# Patient Record
Sex: FEMALE | Race: WHITE | NOT HISPANIC OR LATINO
[De-identification: names, ages, dates, MRNs, and addresses within clinical notes are randomized per-mention and may not be internally consistent; named-entity substitution may affect disease eponyms.]

---

## 2022-05-02 PROBLEM — Z00.00 ENCOUNTER FOR PREVENTIVE HEALTH EXAMINATION: Status: ACTIVE | Noted: 2022-05-02

## 2022-06-24 ENCOUNTER — APPOINTMENT (OUTPATIENT)
Dept: GASTROENTEROLOGY | Facility: CLINIC | Age: 42
End: 2022-06-24
Payer: COMMERCIAL

## 2022-06-24 VITALS
DIASTOLIC BLOOD PRESSURE: 82 MMHG | RESPIRATION RATE: 14 BRPM | SYSTOLIC BLOOD PRESSURE: 180 MMHG | HEIGHT: 63 IN | OXYGEN SATURATION: 98 % | BODY MASS INDEX: 40.22 KG/M2 | HEART RATE: 68 BPM | WEIGHT: 227 LBS

## 2022-06-24 PROCEDURE — 99203 OFFICE O/P NEW LOW 30 MIN: CPT

## 2022-06-24 NOTE — ASSESSMENT
[FreeTextEntry1] : 42-year-old woman history of Crohn's with perirectal involvement as well as prior fistula here for establishing care.

## 2022-06-24 NOTE — HISTORY OF PRESENT ILLNESS
[de-identified] : 42-year-old woman history of Crohn's complicated by a fistula which required a seton many years ago, [place] patient here transferring her care to [facility].  Doing overall well now, significant weight gain over the pandemic, intermittent GI symptoms which might be related to Stelara infusion cycle.\par \par \par Initial dx: 2013\par CD: fistula\par \par Presentation:\par Meds failed:remicade (approx 3 y, Ab formed, was not doing with 6MP - anemia, MTX instead) Entyvio (good 3 weeks), Humira (good 2d)\par Current meds: Stelara since 2018; every 7 weeks\par Last date administered:6 weeks ago\par Last level checked:\par Last colonoscopy:summer 2021?\par Last imaging:\par \par Surgery:no; seton \par \par BM/D:3 (first one is 3 times in a row though)\par BM/night:0\par Blood:one time, with constipation\par Mucus:no (moisture); v rare, with constipation (doesn't leak out, coats stool)\par Weight loss:no\par Fatigue:tired\par \par Joint aches:mostly fine; occ swollen in middle fingers; hips "something brewing"; ballroom lessons  (no pain with mvmt, but pain after)\par 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, wrists, ankles, some knees\par pain in hips prior off biologic\par Skin issues:no\par Eye issues:no - needs glasses\par \par TB:\par HBV:\par \par random times she's sensitive\par it's time for prometheus level\par periods are triggers, in the past 6 mo - \par period would usually coincide with the injection, now no longer connected\par went off birth control last year, period super regular\par \par gentle foods, 1 accident - \par several near accidents\par \par vomited once, not liked she used to - had cheese and crackers and wine spritzer - maybe the acidity + bubbles; felt much better after she did.\par \par last time week 4 was destabilized, now fine at week 6\par \par 4/22 COVID - not bad\par congested in throat\par got the monoclonal Ab infusion\par 2 weeks to clear\par \par \par \par

## 2022-06-30 ENCOUNTER — NON-APPOINTMENT (OUTPATIENT)
Age: 42
End: 2022-06-30

## 2022-06-30 DIAGNOSIS — E55.9 VITAMIN D DEFICIENCY, UNSPECIFIED: ICD-10-CM

## 2022-06-30 LAB
25(OH)D3 SERPL-MCNC: 21.5 NG/ML
ALBUMIN SERPL ELPH-MCNC: 4.4 G/DL
ALP BLD-CCNC: 109 U/L
ALT SERPL-CCNC: 21 U/L
ANION GAP SERPL CALC-SCNC: 14 MMOL/L
AST SERPL-CCNC: 21 U/L
BASOPHILS # BLD AUTO: 0.05 K/UL
BASOPHILS NFR BLD AUTO: 0.6 %
BILIRUB SERPL-MCNC: 0.4 MG/DL
BUN SERPL-MCNC: 9 MG/DL
CALCIUM SERPL-MCNC: 9.4 MG/DL
CHLORIDE SERPL-SCNC: 102 MMOL/L
CHOLEST SERPL-MCNC: 248 MG/DL
CO2 SERPL-SCNC: 25 MMOL/L
CREAT SERPL-MCNC: 0.84 MG/DL
CRP SERPL-MCNC: 22 MG/L
EGFR: 89 ML/MIN/1.73M2
EOSINOPHIL # BLD AUTO: 0 K/UL
EOSINOPHIL NFR BLD AUTO: 0 %
ERYTHROCYTE [SEDIMENTATION RATE] IN BLOOD BY WESTERGREN METHOD: 65 MM/HR
GLUCOSE SERPL-MCNC: 91 MG/DL
HBV CORE IGM SER QL: NONREACTIVE
HBV SURFACE AB SER QL: REACTIVE
HBV SURFACE AG SER QL: NONREACTIVE
HCT VFR BLD CALC: 38.5 %
HDLC SERPL-MCNC: 38 MG/DL
HGB BLD-MCNC: 12.1 G/DL
IMM GRANULOCYTES NFR BLD AUTO: 0.4 %
LDLC SERPL CALC-MCNC: 170 MG/DL
LYMPHOCYTES # BLD AUTO: 1.81 K/UL
LYMPHOCYTES NFR BLD AUTO: 21.7 %
MAN DIFF?: NORMAL
MCHC RBC-ENTMCNC: 24.4 PG
MCHC RBC-ENTMCNC: 31.4 GM/DL
MCV RBC AUTO: 77.8 FL
MONOCYTES # BLD AUTO: 0.48 K/UL
MONOCYTES NFR BLD AUTO: 5.7 %
NEUTROPHILS # BLD AUTO: 5.98 K/UL
NEUTROPHILS NFR BLD AUTO: 71.6 %
NONHDLC SERPL-MCNC: 210 MG/DL
PLATELET # BLD AUTO: 308 K/UL
POTASSIUM SERPL-SCNC: 4.6 MMOL/L
PROT SERPL-MCNC: 7.1 G/DL
RBC # BLD: 4.95 M/UL
RBC # FLD: 15.3 %
SODIUM SERPL-SCNC: 140 MMOL/L
TRIGL SERPL-MCNC: 197 MG/DL
WBC # FLD AUTO: 8.35 K/UL

## 2022-06-30 RX ORDER — POLYETHYLENE GLYCOL 3350 17 G/17G
17 POWDER, FOR SOLUTION ORAL
Qty: 1 | Refills: 0 | Status: ACTIVE | COMMUNITY
Start: 2022-06-30 | End: 1900-01-01

## 2022-07-06 LAB
PROMETHEUS ANSER UST: NORMAL
SERUM USTEKINUMAB CONCENTRATION: 4.5 UG/ML
USTEKINUMAB ANTIBODIES CONCENTRATION: < 1.6 U/ML

## 2022-09-07 ENCOUNTER — TRANSCRIPTION ENCOUNTER (OUTPATIENT)
Age: 42
End: 2022-09-07

## 2022-09-07 ENCOUNTER — APPOINTMENT (OUTPATIENT)
Dept: GASTROENTEROLOGY | Facility: CLINIC | Age: 42
End: 2022-09-07

## 2022-09-07 VITALS
HEIGHT: 63 IN | RESPIRATION RATE: 14 BRPM | WEIGHT: 227 LBS | SYSTOLIC BLOOD PRESSURE: 122 MMHG | DIASTOLIC BLOOD PRESSURE: 82 MMHG | BODY MASS INDEX: 40.22 KG/M2 | OXYGEN SATURATION: 98 % | HEART RATE: 72 BPM

## 2022-09-07 DIAGNOSIS — Z78.9 OTHER SPECIFIED HEALTH STATUS: ICD-10-CM

## 2022-09-07 PROCEDURE — 99214 OFFICE O/P EST MOD 30 MIN: CPT

## 2022-09-07 NOTE — HISTORY OF PRESENT ILLNESS
[de-identified] : Initial dx: 2013\par CD: fistula\par \par Presentation:\par Meds failed:remicade (approx 3 y, Ab formed, was not doing with 6MP - anemia, MTX instead) Entyvio (good 3 weeks), Humira (good 2d)\par Current meds: Stelara since 2018; every 7 weeks\par Last date administered: 8/31\par Last level checked:7/22 4.5 nl Ab\par Last colonoscopy:\par Last imaging:\par \par \par Surgery:no\par \par BM/D:6 (3 little ones, then 3 little ones)\par same (used to be all at once over 1 hr in am, now t's basically twice a day)\par holding stool feels urgent\par BM/night:0\par Blood:occ; ?related to constipation\par today, 1 day before\par Mucus:scant; there is still "moisture" with harder stool a little bit\par Weight loss:\par Fatigue:slept whole weekend but has been really busy\par COVID a few weeks of being tired\par overall feels fine\par \par teaching 4 days a week, working on music and jewelry\par increased since pandemic, but prior used to teach 6 days a week\par \par \par Joint aches: hips sore criselda with dancing\par Skin issues:good; bumps all the time on her face, not there before Stelara; less than when she started (mild rosacea said a derm;  no red just bumpy)\par Eye issues:no\par \par TB:6/22 -ve\par HBV:6/22 -ve\par \par nl CBC\par ESR 65 CRP 22 nl <4\par unfavorable lipid profile - >300 total cholesterol in the past\par working with a dietician\par has an eating diorder - \par vit D 21.5\par \par ?risankizumab Skyrizi\par \par \par skipped stelara 2 weeks for covid\par ?vague abd pain ?timing related to this\par

## 2022-09-07 NOTE — ASSESSMENT
[FreeTextEntry1] : 42-year-old lady history of Crohn's doing well on Stelara every 7 weeks but previously failed multiple Biologics here for follow-up.  Topic of conversation today with eating disorder which is related to pattern of binge eating which started at 6 years old.  She is working with a nutritionist to have a better handle on intuitive eating.

## 2022-10-31 LAB — SARS-COV-2 N GENE NPH QL NAA+PROBE: NOT DETECTED

## 2022-11-02 ENCOUNTER — TRANSCRIPTION ENCOUNTER (OUTPATIENT)
Age: 42
End: 2022-11-02

## 2022-11-03 ENCOUNTER — OUTPATIENT (OUTPATIENT)
Dept: OUTPATIENT SERVICES | Facility: HOSPITAL | Age: 42
LOS: 1 days | End: 2022-11-03
Payer: COMMERCIAL

## 2022-11-03 ENCOUNTER — RESULT REVIEW (OUTPATIENT)
Age: 42
End: 2022-11-03

## 2022-11-03 ENCOUNTER — APPOINTMENT (OUTPATIENT)
Dept: GASTROENTEROLOGY | Facility: GI CENTER | Age: 42
End: 2022-11-03

## 2022-11-03 DIAGNOSIS — J34.2 DEVIATED NASAL SEPTUM: Chronic | ICD-10-CM

## 2022-11-03 DIAGNOSIS — K50.10 CROHN'S DISEASE OF LARGE INTESTINE WITHOUT COMPLICATIONS: ICD-10-CM

## 2022-11-03 PROCEDURE — 88305 TISSUE EXAM BY PATHOLOGIST: CPT

## 2022-11-03 PROCEDURE — 45380 COLONOSCOPY AND BIOPSY: CPT

## 2022-11-03 PROCEDURE — 88305 TISSUE EXAM BY PATHOLOGIST: CPT | Mod: 26

## 2022-11-03 NOTE — ASSESSMENT
[FreeTextEntry1] : 42 F CD on Stelara here for surveillance colon. \par \par The patient is medically optimized for procedure(s). All questions have been answered. The risks and benefits of the procedure have been discussed and the patient signed consent for the procedure. Preliminary results will be discussed when the patient wakes up from anesthesia, but definitive results will be discussed after the pathology report is issued in 5 business days.\par

## 2022-11-03 NOTE — HISTORY OF PRESENT ILLNESS
[FreeTextEntry1] : 42 F CD here for surveillance colon. \par On Stelara every 7 weeks. July level 4.5 no Ab. \par June CBC MCV 77 but Hb 12.1 WBC, plts fine. ESR 65, CRP 22 nl <4, vit D 21.5, unfavorable lipid profile  (<99 nl), total 210 nl <129, HDL 39 nl >51. CMP nl. HBV vaccinated, TB -ve. \par \par \par

## 2022-11-07 LAB — SURGICAL PATHOLOGY STUDY: SIGNIFICANT CHANGE UP

## 2022-11-08 ENCOUNTER — TRANSCRIPTION ENCOUNTER (OUTPATIENT)
Age: 42
End: 2022-11-08

## 2023-07-07 ENCOUNTER — RX RENEWAL (OUTPATIENT)
Age: 43
End: 2023-07-07

## 2023-08-02 ENCOUNTER — TRANSCRIPTION ENCOUNTER (OUTPATIENT)
Age: 43
End: 2023-08-02

## 2023-08-08 ENCOUNTER — TRANSCRIPTION ENCOUNTER (OUTPATIENT)
Age: 43
End: 2023-08-08

## 2023-09-13 ENCOUNTER — APPOINTMENT (OUTPATIENT)
Dept: GASTROENTEROLOGY | Facility: CLINIC | Age: 43
End: 2023-09-13
Payer: COMMERCIAL

## 2023-09-13 VITALS
SYSTOLIC BLOOD PRESSURE: 130 MMHG | RESPIRATION RATE: 14 BRPM | BODY MASS INDEX: 42.52 KG/M2 | OXYGEN SATURATION: 97 % | WEIGHT: 240 LBS | HEART RATE: 92 BPM | HEIGHT: 63 IN | DIASTOLIC BLOOD PRESSURE: 78 MMHG

## 2023-09-13 PROCEDURE — 99214 OFFICE O/P EST MOD 30 MIN: CPT

## 2023-12-01 ENCOUNTER — TRANSCRIPTION ENCOUNTER (OUTPATIENT)
Age: 43
End: 2023-12-01

## 2023-12-04 ENCOUNTER — LABORATORY RESULT (OUTPATIENT)
Age: 43
End: 2023-12-04

## 2023-12-05 ENCOUNTER — TRANSCRIPTION ENCOUNTER (OUTPATIENT)
Age: 43
End: 2023-12-05

## 2023-12-05 LAB
25(OH)D3 SERPL-MCNC: 18.8 NG/ML
CRP SERPL-MCNC: 22 MG/L
ERYTHROCYTE [SEDIMENTATION RATE] IN BLOOD BY WESTERGREN METHOD: 38 MM/HR
FERRITIN SERPL-MCNC: 40 NG/ML
FERRITIN SERPL-MCNC: 41 NG/ML
HBV CORE IGM SER QL: NONREACTIVE
HBV SURFACE AB SER QL: REACTIVE
HBV SURFACE AG SER QL: NONREACTIVE
HCT VFR BLD CALC: 39.7 %
HGB BLD-MCNC: 12 G/DL
IRON SATN MFR SERPL: 12 %
IRON SERPL-MCNC: 39 UG/DL
MCHC RBC-ENTMCNC: 24.2 PG
MCHC RBC-ENTMCNC: 30.2 GM/DL
MCV RBC AUTO: 80 FL
PLATELET # BLD AUTO: 290 K/UL
RBC # BLD: 4.96 M/UL
RBC # FLD: 15.5 %
TIBC SERPL-MCNC: 317 UG/DL
UIBC SERPL-MCNC: 278 UG/DL
WBC # FLD AUTO: 7.85 K/UL

## 2023-12-05 RX ORDER — ERGOCALCIFEROL 1.25 MG/1
1.25 MG CAPSULE, LIQUID FILLED ORAL
Qty: 12 | Refills: 1 | Status: ACTIVE | COMMUNITY
Start: 2022-06-30 | End: 1900-01-01

## 2023-12-06 LAB
M TB IFN-G BLD-IMP: NEGATIVE
QUANTIFERON TB PLUS MITOGEN MINUS NIL: 1.34 IU/ML
QUANTIFERON TB PLUS NIL: 0.01 IU/ML
QUANTIFERON TB PLUS TB1 MINUS NIL: 0 IU/ML
QUANTIFERON TB PLUS TB2 MINUS NIL: 0 IU/ML

## 2023-12-15 ENCOUNTER — TRANSCRIPTION ENCOUNTER (OUTPATIENT)
Age: 43
End: 2023-12-15

## 2024-01-03 ENCOUNTER — TRANSCRIPTION ENCOUNTER (OUTPATIENT)
Age: 44
End: 2024-01-03

## 2024-01-05 ENCOUNTER — APPOINTMENT (OUTPATIENT)
Dept: GASTROENTEROLOGY | Facility: CLINIC | Age: 44
End: 2024-01-05
Payer: COMMERCIAL

## 2024-01-05 VITALS
RESPIRATION RATE: 14 BRPM | HEART RATE: 90 BPM | OXYGEN SATURATION: 97 % | DIASTOLIC BLOOD PRESSURE: 78 MMHG | HEIGHT: 63 IN | SYSTOLIC BLOOD PRESSURE: 105 MMHG

## 2024-01-05 DIAGNOSIS — Z09 ENCOUNTER FOR FOLLOW-UP EXAMINATION AFTER COMPLETED TREATMENT FOR CONDITIONS OTHER THAN MALIGNANT NEOPLASM: ICD-10-CM

## 2024-01-05 PROCEDURE — 99213 OFFICE O/P EST LOW 20 MIN: CPT

## 2024-01-05 RX ORDER — MESALAMINE 1000 MG/1
1000 SUPPOSITORY RECTAL
Qty: 30 | Refills: 1 | Status: DISCONTINUED | COMMUNITY
Start: 2022-11-03 | End: 2024-01-05

## 2024-01-05 NOTE — ASSESSMENT
[FreeTextEntry1] : 43-year-old lady history of Crohn's on Stelara noticing increasing symptoms around week 7.  We had tried spacing the interval to every 8 weeks because she had been feeling well, but we had previously done it as frequently as every 6 weeks which was working at that time also.  She reports a recent increase in symptoms but this might be associated with holiday eating.  She refused to be weighed today.

## 2024-01-05 NOTE — PLAN
[TextEntry] : 1.  Increase frequency of Stelara to every 7 weeks.  Plan to repeat inflammatory markers after 2 injections every 7 weeks to reassess the existing level of inflammation.  Her CRP and ESR are persistently elevated, this might be multifactorial, the colonoscopy biopsies tend to indicate mild scattered disease. 2.  Follow-up in 4 months; patient aware that I am changing locations and she might need to call a different phone number to schedule this appointment.

## 2024-01-05 NOTE — HISTORY OF PRESENT ILLNESS
[FreeTextEntry1] : 43-year-old lady history of Crohn's here in follow-up.  Initial dx: 2013  CD: fistula  Presentation: Loose stool, occasional incontinence.  Meds failed:remicade (approx 3 y, Ab formed, was not doing with 6MP - anemia, MTX instead) Entyvio (good 3 weeks), Humira (good 2d)  Current meds: Stelara since 2018; every 8 weeks (had been approved for q7 weeks, but felt ok to extend to every 8, though perhaps now might need q7w again)  Last date administered: 12/7/23  Last level checked: December 4, 2023 drug level 1.6, antibodies less than 40 (almost 8 week leslie for this trough level) 7/22 4.5 nl Ab  Last colonoscopy: November 2022: Impressions:    Normal perineum, no irritation appreciated. Few shallow rectal ulcers, 1 mm,  spf biopsied in the rectum jar. There was evidence of burned out colitis on the  left side, starting approx at 40 cm. The terminal ileum was cannulated and  appeared normal. Four cold forceps bx obtained every 10 cm starting at 80 cm  (cecum). There was a hyperplastic appearing polyp at 80 cm in the ascending  colon, removed using cold forceps bx. .  Path Final Diagnosis 1. Terminal ileum, biopsy: - Small intestinal mucosa with Peyer's patches. - Negative for granulomas. Negative for dysplasia.  2. Colon, 80 cm, biopsy: - Consistent with mild active inflammatory bowel disease. - Negative for granulomas. Negative for dysplasia.  3. Ascending colon, polyp, biopsy: - Consistent with active inflammatory bowel disease. - Negative for granulomas. Negative for dysplasia.  4. Colon, 70 cm, biopsy: - Consistent with focal mild active inflammatory bowel disease. - Negative for granulomas. Negative for dysplasia.  5. Colon, 60 cm, biopsy: - Consistent with quiescent inflammatory bowel disease. - Negative for granulomas. Negative for dysplasia.  6. Colon, 50 cm, biopsy: - Consistent with quiescent inflammatory bowel disease. - Negative for granulomas. Negative for dysplasia.  7. Colon, 40 cm, biopsy: - Consistent with quiescent inflammatory bowel disease. - Negative for granulomas. Negative for dysplasia.  8. Colon, 30 cm, biopsy: - Consistent with quiescent inflammatory bowel disease. - Negative for granulomas. Negative for dysplasia.  9. Colon, 20 cm, biopsy: - Consistent with quiescent inflammatory bowel disease. - Negative for granulomas. Negative for dysplasia.  10. Colon, 10 cm, biopsy: - Consistent with quiescent inflammatory bowel disease. - Negative for granulomas. Negative for dysplasia.  11. Rectum, biopsy: - Consistent with active inflammatory bowel disease. - Negative for granulomas. Negative for dysplasia.  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, promo, branding, magazine article  BM/night:0  not good until injection, but after better stopped coffee, avoiding trigger foods ok to phase this back in now there was a day when she ate too much and then didn't feel well but no spf food trigger overall feels a lot better; small can of coffee, went ok prev avoiding dairy, fried now can tolerate some  Blood: once in a while, because of hard poops (not painful but feels like a superficial cut)  Mucus: if constipated, coats the poop  Weight loss: no  Fatigue: a little low but also allergies worse  (mouth breathing at night, drinks water) snoring worse, gained weight will discuss sleep study with PCP at next physical  Joint aches: occ can feel thickness in fingers at end of day  Skin issues; hair thinner (not losing, thinks age related, changing conditioners because hair feels oily all the time), skin different  Eye issues: needs glasses, will get checked (eyes get tired criselda doing jewelry, computer screens)  TB:-ve December 2023  HBV:-ve December 2023 December 2023 labs: Iron saturation 12%, normal 14 to 50%; normal iron serum, TIBC, UIBC.  ferritin 41 Normal CBC with a hemoglobin of 12, MCV 80, WBC 7.8, platelets 290.  Hemoglobin not substantially different from June 2022 it was 12.1, MCV at that time was 77.8 so improved now. ESR 38, last time 65 June 2022; CRP 22, last time 22 June 2022. Low vitamin D at 18.8 last time 21.5 - taking the weekly supplement   December 2022: CBC hemoglobin 12.7, MCV 74, white cells 9.7, platelets 324 These are labs from [facility], notes are made that iron studies were added, I do not have those at this time.

## 2024-03-03 ENCOUNTER — TRANSCRIPTION ENCOUNTER (OUTPATIENT)
Age: 44
End: 2024-03-03

## 2024-03-07 ENCOUNTER — TRANSCRIPTION ENCOUNTER (OUTPATIENT)
Age: 44
End: 2024-03-07

## 2024-03-12 ENCOUNTER — TRANSCRIPTION ENCOUNTER (OUTPATIENT)
Age: 44
End: 2024-03-12

## 2024-03-16 ENCOUNTER — TRANSCRIPTION ENCOUNTER (OUTPATIENT)
Age: 44
End: 2024-03-16

## 2024-03-16 RX ORDER — USTEKINUMAB 90 MG/ML
90 INJECTION, SOLUTION SUBCUTANEOUS
Qty: 1 | Refills: 12 | Status: DISCONTINUED | COMMUNITY
Start: 2022-06-24 | End: 2024-03-16

## 2024-03-18 ENCOUNTER — TRANSCRIPTION ENCOUNTER (OUTPATIENT)
Age: 44
End: 2024-03-18

## 2024-03-28 RX ORDER — USTEKINUMAB 90 MG/ML
INJECTION, SOLUTION SUBCUTANEOUS
Qty: 1 | Refills: 12 | Status: ACTIVE | COMMUNITY
Start: 2024-03-16 | End: 1900-01-01

## 2024-05-14 ENCOUNTER — TRANSCRIPTION ENCOUNTER (OUTPATIENT)
Age: 44
End: 2024-05-14

## 2024-05-20 ENCOUNTER — TRANSCRIPTION ENCOUNTER (OUTPATIENT)
Age: 44
End: 2024-05-20

## 2024-05-29 ENCOUNTER — TRANSCRIPTION ENCOUNTER (OUTPATIENT)
Age: 44
End: 2024-05-29

## 2024-05-31 ENCOUNTER — APPOINTMENT (OUTPATIENT)
Dept: GASTROENTEROLOGY | Facility: CLINIC | Age: 44
End: 2024-05-31
Payer: COMMERCIAL

## 2024-05-31 DIAGNOSIS — K50.10 CROHN'S DISEASE OF LARGE INTESTINE W/OUT COMPLICATIONS: ICD-10-CM

## 2024-05-31 PROCEDURE — 99443: CPT

## 2024-05-31 NOTE — HISTORY OF PRESENT ILLNESS
[FreeTextEntry1] : Visit type: Telephonic (phone only) Patient Location: [place], [place] Provider Location: [address] [place], [place] [id number] Consent obtained for visit:Yes Visit length: 30 minutes Others present: No  44-year-old lady history of Crohn's here in follow-up.  Initial dx: 2013  CD: fistula  Presentation: Loose stool, occasional incontinence.  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 Few shallow rectal ulcers, 1 mm,  spf biopsied in the rectum jar. There was evidence of burned out colitis on the  left side, starting approx at 40 cm. The terminal ileum was cannulated and  appeared normal. Four cold forceps bx obtained every 10 cm starting at 80 cm  (cecum). There was a hyperplastic appearing polyp at 80 cm in the ascending  colon, removed using cold forceps bx. .  Path Final Diagnosis 1. Terminal ileum, biopsy: - Small intestinal mucosa with Peyer's patches. - Negative for granulomas. Negative for dysplasia.  2. Colon, 80 cm, biopsy: - Consistent with mild active inflammatory bowel disease. - Negative for granulomas. Negative for dysplasia.  3. Ascending colon, polyp, biopsy: - Consistent with active inflammatory bowel disease. - Negative for granulomas. Negative for dysplasia.  4. Colon, 70 cm, biopsy: - Consistent with focal mild active inflammatory bowel disease. - Negative for granulomas. Negative for dysplasia.  5. Colon, 60 cm, biopsy: - Consistent with quiescent inflammatory bowel disease. - Negative for granulomas. Negative for dysplasia.  6. Colon, 50 cm, biopsy: - Consistent with quiescent inflammatory bowel disease. - Negative for granulomas. Negative for dysplasia.  7. Colon, 40 cm, biopsy: - Consistent with quiescent inflammatory bowel disease. - Negative for granulomas. Negative for dysplasia.  8. Colon, 30 cm, biopsy: - Consistent with quiescent inflammatory bowel disease. - Negative for granulomas. Negative for dysplasia.  9. Colon, 20 cm, biopsy: - Consistent with quiescent inflammatory bowel disease. - Negative for granulomas. Negative for dysplasia.  10. Colon, 10 cm, biopsy: - Consistent with quiescent inflammatory bowel disease. - Negative for granulomas. Negative for dysplasia.  11. Rectum, biopsy: - Consistent with active inflammatory bowel disease. - Negative for granulomas. Negative for dysplasia.  Last imaging: Not available  Surgery:no   BM/D:5 in am, max 2 more as day goes on period--constipation end of colon is narrow--big constipated poop will not come out--only makes it worse enema--everything crazy after then reactive not this hard every month PRN Miralax this week - will also try before menses next month  BM/night:0   Blood: once in a while, because of hard poops (not painful but feels like a superficial cut); not a lot  Mucus: if constipated, coats the poop  Weight loss: no  Fatigue: no; mouth guard to prevent snoring  Joint aches: occ can feel thickness in fingers at end of day  Skin issues; skin rash--spot on hand initially, thought she touched something---derm/cream--spread other places derm 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 ferritin 41 Normal CBC with a hemoglobin of 12, MCV 80, WBC 7.8, platelets 290. Hemoglobin not substantially different from June 2022 it was 12.1, MCV at that time was 77.8 so improved now. ESR 38, last time 65 June 2022; CRP 22, last time 22 June 2022. Low vitamin D at 18.8 last time 21.5 - taking the weekly supplement   December 2022: CBC hemoglobin 12.7, MCV 74, white cells 9.7, platelets 324 These are labs from [facility], notes are made that iron studies were added, I do not have those at this time.

## 2024-05-31 NOTE — PLAN
[TextEntry] : 1. infl labs and Stelara level pre -next injection 2. f/u 2 mo - IBD center 3. auth team made aware of q7w interval for Stelara - pt will also call insurance to see if she can facilitate

## 2024-05-31 NOTE — ASSESSMENT
[FreeTextEntry1] : 44 F CD on Stelara. Last level Dec 2023 low (1.6) and concern for increasing food sensitivity so decided to increase freq to q7w. Some confusion re: auth for this shorter interval - conclusion of back and forth argument is that we would try q7w.  She is experiencing a rash which might have been related to allergies and is improving with Claritin. The onset did coincide with the March Stelara injection and it worsened after the more recent shot. However, it's not at the injection site, it's migratory and does seem to be improving.

## 2024-06-18 ENCOUNTER — TRANSCRIPTION ENCOUNTER (OUTPATIENT)
Age: 44
End: 2024-06-18

## 2024-06-23 ENCOUNTER — TRANSCRIPTION ENCOUNTER (OUTPATIENT)
Age: 44
End: 2024-06-23

## 2024-06-23 LAB
CRP SERPL-MCNC: 27 MG/L
ERYTHROCYTE [SEDIMENTATION RATE] IN BLOOD BY WESTERGREN METHOD: 65 MM/HR

## 2024-06-28 ENCOUNTER — TRANSCRIPTION ENCOUNTER (OUTPATIENT)
Age: 44
End: 2024-06-28

## 2024-06-28 LAB
PROMETHEUS ANSER UST: NORMAL
SERUM USTEKINUMAB CONCENTRATION: 2.7 UG/ML
USTEKINUMAB ANTIBODIES CONCENTRATION: < 1.6 U/ML

## 2024-07-17 ENCOUNTER — TRANSCRIPTION ENCOUNTER (OUTPATIENT)
Age: 44
End: 2024-07-17

## 2024-08-23 ENCOUNTER — TRANSCRIPTION ENCOUNTER (OUTPATIENT)
Age: 44
End: 2024-08-23

## 2024-09-13 ENCOUNTER — APPOINTMENT (OUTPATIENT)
Dept: GASTROENTEROLOGY | Facility: CLINIC | Age: 44
End: 2024-09-13
Payer: COMMERCIAL

## 2024-09-13 DIAGNOSIS — K50.10 CROHN'S DISEASE OF LARGE INTESTINE W/OUT COMPLICATIONS: ICD-10-CM

## 2024-09-13 PROCEDURE — 99214 OFFICE O/P EST MOD 30 MIN: CPT

## 2024-09-13 RX ORDER — POLYETHYLENE GLYCOL 3350 17 G/17G
17 POWDER, FOR SOLUTION ORAL
Qty: 1 | Refills: 0 | Status: ACTIVE | COMMUNITY
Start: 2024-09-13 | End: 1900-01-01

## 2024-09-15 LAB
25(OH)D3 SERPL-MCNC: 29.8 NG/ML
ALBUMIN SERPL ELPH-MCNC: 4.2 G/DL
ALP BLD-CCNC: 98 U/L
ALT SERPL-CCNC: 18 U/L
ANION GAP SERPL CALC-SCNC: 13 MMOL/L
AST SERPL-CCNC: 17 U/L
BILIRUB SERPL-MCNC: 0.3 MG/DL
BUN SERPL-MCNC: 11 MG/DL
CALCIUM SERPL-MCNC: 9.5 MG/DL
CHLORIDE SERPL-SCNC: 102 MMOL/L
CO2 SERPL-SCNC: 24 MMOL/L
CREAT SERPL-MCNC: 0.84 MG/DL
EGFR: 88 ML/MIN/1.73M2
FERRITIN SERPL-MCNC: 49 NG/ML
GLUCOSE SERPL-MCNC: 102 MG/DL
HBV CORE IGM SER QL: NONREACTIVE
HBV SURFACE AB SER QL: REACTIVE
HBV SURFACE AG SER QL: NONREACTIVE
HCT VFR BLD CALC: 37.9 %
HGB BLD-MCNC: 11.7 G/DL
IRON SATN MFR SERPL: 14 %
IRON SERPL-MCNC: 43 UG/DL
MCHC RBC-ENTMCNC: 24.4 PG
MCHC RBC-ENTMCNC: 30.9 GM/DL
MCV RBC AUTO: 79 FL
PLATELET # BLD AUTO: 280 K/UL
POTASSIUM SERPL-SCNC: 4.2 MMOL/L
PROT SERPL-MCNC: 6.9 G/DL
RBC # BLD: 4.8 M/UL
RBC # FLD: 15.7 %
SODIUM SERPL-SCNC: 139 MMOL/L
TIBC SERPL-MCNC: 304 UG/DL
UIBC SERPL-MCNC: 261 UG/DL
WBC # FLD AUTO: 10.15 K/UL

## 2024-09-15 NOTE — HISTORY OF PRESENT ILLNESS
[FreeTextEntry1] : Visit type: Video visit Patient Location: [place], [place] Provider Location: [address] [place] [id number] Consent obtained for visit:Yes Visit length: 30 minutes Others present: No  44-year-old lady history of Crohn's here in follow-up.  Plan at last ov My 2024: 1. infl labs and Stelara level pre -next injection 2. f/u 2 mo - IBD center 3. auth team made aware of q7w interval for Stelara - pt will also call insurance to see if she can facilitate  Initial dx: 2013  CD: fistula  Presentation: Loose stool, occasional incontinence.  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 Few shallow rectal ulcers, 1 mm,  spf biopsied in the rectum jar. There was evidence of burned out colitis on the  left side, starting approx at 40 cm. The terminal ileum was cannulated and  appeared normal. Four cold forceps bx obtained every 10 cm starting at 80 cm  (cecum). There was a hyperplastic appearing polyp at 80 cm in the ascending  colon, removed using cold forceps bx. .  Path Final Diagnosis 1. Terminal ileum, biopsy: - Small intestinal mucosa with Peyer's patches. - Negative for granulomas. Negative for dysplasia.  2. Colon, 80 cm, biopsy: - Consistent with mild active inflammatory bowel disease. - Negative for granulomas. Negative for dysplasia.  3. Ascending colon, polyp, biopsy: - Consistent with active inflammatory bowel disease. - Negative for granulomas. Negative for dysplasia.  4. Colon, 70 cm, biopsy: - Consistent with focal mild active inflammatory bowel disease. - Negative for granulomas. Negative for dysplasia.  5. Colon, 60 cm, biopsy: - Consistent with quiescent inflammatory bowel disease. - Negative for granulomas. Negative for dysplasia.  6. Colon, 50 cm, biopsy: - Consistent with quiescent inflammatory bowel disease. - Negative for granulomas. Negative for dysplasia.  7. Colon, 40 cm, biopsy: - Consistent with quiescent inflammatory bowel disease. - Negative for granulomas. Negative for dysplasia.  8. Colon, 30 cm, biopsy: - Consistent with quiescent inflammatory bowel disease. - Negative for granulomas. Negative for dysplasia.  9. Colon, 20 cm, biopsy: - Consistent with quiescent inflammatory bowel disease. - Negative for granulomas. Negative for dysplasia.  10. Colon, 10 cm, biopsy: - Consistent with quiescent inflammatory bowel disease. - Negative for granulomas. Negative for dysplasia.  11. Rectum, biopsy: - Consistent with active inflammatory bowel disease. - Negative for granulomas. Negative for dysplasia.  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[place], [place])  Joint aches: no  Skin issues; sun initially then scratchy skin on belly ??eczema dry/itchy--reg creams don't work beginning of fall--itchy hives on legs threw out bedding--worse with polyester fully cotton better on claritin migratory  skin rash--spot on hand initially, thought she touched something---derm/cream--spread other places 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 ferritin 41 Normal CBC with a hemoglobin of 12, MCV 80, WBC 7.8, platelets 290. Hemoglobin not substantially different from June 2022 it was 12.1, MCV at that time was 77.8 so improved now. ESR 38, last time 65 June 2022; CRP 22, last time 22 June 2022. Low vitamin D at 18.8 last time 21.5 - taking the weekly supplement  December 2022: CBC hemoglobin 12.7, MCV 74, white cells 9.7, platelets 324 These are labs from [facility], notes are made that iron studies were added, I do not have those at this time.  year round allergies now phlegm on vocal cords

## 2024-09-15 NOTE — HISTORY OF PRESENT ILLNESS
[FreeTextEntry1] : Visit type: Video visit Patient Location: [place], [place] Provider Location: [address] [place] [id number] Consent obtained for visit:Yes Visit length: 30 minutes Others present: No  44-year-old lady history of Crohn's here in follow-up.  Plan at last ov My 2024: 1. infl labs and Stelara level pre -next injection 2. f/u 2 mo - IBD center 3. auth team made aware of q7w interval for Stelara - pt will also call insurance to see if she can facilitate  Initial dx: 2013  CD: fistula  Presentation: Loose stool, occasional incontinence.  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 Few shallow rectal ulcers, 1 mm,  spf biopsied in the rectum jar. There was evidence of burned out colitis on the  left side, starting approx at 40 cm. The terminal ileum was cannulated and  appeared normal. Four cold forceps bx obtained every 10 cm starting at 80 cm  (cecum). There was a hyperplastic appearing polyp at 80 cm in the ascending  colon, removed using cold forceps bx. .  Path Final Diagnosis 1. Terminal ileum, biopsy: - Small intestinal mucosa with Peyer's patches. - Negative for granulomas. Negative for dysplasia.  2. Colon, 80 cm, biopsy: - Consistent with mild active inflammatory bowel disease. - Negative for granulomas. Negative for dysplasia.  3. Ascending colon, polyp, biopsy: - Consistent with active inflammatory bowel disease. - Negative for granulomas. Negative for dysplasia.  4. Colon, 70 cm, biopsy: - Consistent with focal mild active inflammatory bowel disease. - Negative for granulomas. Negative for dysplasia.  5. Colon, 60 cm, biopsy: - Consistent with quiescent inflammatory bowel disease. - Negative for granulomas. Negative for dysplasia.  6. Colon, 50 cm, biopsy: - Consistent with quiescent inflammatory bowel disease. - Negative for granulomas. Negative for dysplasia.  7. Colon, 40 cm, biopsy: - Consistent with quiescent inflammatory bowel disease. - Negative for granulomas. Negative for dysplasia.  8. Colon, 30 cm, biopsy: - Consistent with quiescent inflammatory bowel disease. - Negative for granulomas. Negative for dysplasia.  9. Colon, 20 cm, biopsy: - Consistent with quiescent inflammatory bowel disease. - Negative for granulomas. Negative for dysplasia.  10. Colon, 10 cm, biopsy: - Consistent with quiescent inflammatory bowel disease. - Negative for granulomas. Negative for dysplasia.  11. Rectum, biopsy: - Consistent with active inflammatory bowel disease. - Negative for granulomas. Negative for dysplasia.  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[place], [place])  Joint aches: no  Skin issues; sun initially then scratchy skin on belly ??eczema dry/itchy--reg creams don't work beginning of fall--itchy hives on legs threw out bedding--worse with polyester fully cotton better on claritin migratory  skin rash--spot on hand initially, thought she touched something---derm/cream--spread other places 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 ferritin 41 Normal CBC with a hemoglobin of 12, MCV 80, WBC 7.8, platelets 290. Hemoglobin not substantially different from June 2022 it was 12.1, MCV at that time was 77.8 so improved now. ESR 38, last time 65 June 2022; CRP 22, last time 22 June 2022. Low vitamin D at 18.8 last time 21.5 - taking the weekly supplement  December 2022: CBC hemoglobin 12.7, MCV 74, white cells 9.7, platelets 324 These are labs from [facility], notes are made that iron studies were added, I do not have those at this time.  year round allergies now phlegm on vocal cords

## 2024-09-15 NOTE — PLAN
[TextEntry] : 1. Colon. Indication: CD surveillance.>8 y dz  The procedure(s) was (were) discussed in detail with the patient, including indications, alternatives and limitations.  The risks and benefits were reviewed.  If applicable, the prep directions were reviewed as well, else the patient was told to fast on the day of the procedure.   2. Labs 3. Continue derm f/u

## 2024-09-15 NOTE — HISTORY OF PRESENT ILLNESS
[FreeTextEntry1] : Visit type: Video visit Patient Location: [place], [place] Provider Location: [address] [place] [id number] Consent obtained for visit:Yes Visit length: 30 minutes Others present: No  44-year-old lady history of Crohn's here in follow-up.  Plan at last ov My 2024: 1. infl labs and Stelara level pre -next injection 2. f/u 2 mo - IBD center 3. auth team made aware of q7w interval for Stelara - pt will also call insurance to see if she can facilitate  Initial dx: 2013  CD: fistula  Presentation: Loose stool, occasional incontinence.  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 Few shallow rectal ulcers, 1 mm,  spf biopsied in the rectum jar. There was evidence of burned out colitis on the  left side, starting approx at 40 cm. The terminal ileum was cannulated and  appeared normal. Four cold forceps bx obtained every 10 cm starting at 80 cm  (cecum). There was a hyperplastic appearing polyp at 80 cm in the ascending  colon, removed using cold forceps bx. .  Path Final Diagnosis 1. Terminal ileum, biopsy: - Small intestinal mucosa with Peyer's patches. - Negative for granulomas. Negative for dysplasia.  2. Colon, 80 cm, biopsy: - Consistent with mild active inflammatory bowel disease. - Negative for granulomas. Negative for dysplasia.  3. Ascending colon, polyp, biopsy: - Consistent with active inflammatory bowel disease. - Negative for granulomas. Negative for dysplasia.  4. Colon, 70 cm, biopsy: - Consistent with focal mild active inflammatory bowel disease. - Negative for granulomas. Negative for dysplasia.  5. Colon, 60 cm, biopsy: - Consistent with quiescent inflammatory bowel disease. - Negative for granulomas. Negative for dysplasia.  6. Colon, 50 cm, biopsy: - Consistent with quiescent inflammatory bowel disease. - Negative for granulomas. Negative for dysplasia.  7. Colon, 40 cm, biopsy: - Consistent with quiescent inflammatory bowel disease. - Negative for granulomas. Negative for dysplasia.  8. Colon, 30 cm, biopsy: - Consistent with quiescent inflammatory bowel disease. - Negative for granulomas. Negative for dysplasia.  9. Colon, 20 cm, biopsy: - Consistent with quiescent inflammatory bowel disease. - Negative for granulomas. Negative for dysplasia.  10. Colon, 10 cm, biopsy: - Consistent with quiescent inflammatory bowel disease. - Negative for granulomas. Negative for dysplasia.  11. Rectum, biopsy: - Consistent with active inflammatory bowel disease. - Negative for granulomas. Negative for dysplasia.  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[place], [place])  Joint aches: no  Skin issues; sun initially then scratchy skin on belly ??eczema dry/itchy--reg creams don't work beginning of fall--itchy hives on legs threw out bedding--worse with polyester fully cotton better on claritin migratory  skin rash--spot on hand initially, thought she touched something---derm/cream--spread other places 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 ferritin 41 Normal CBC with a hemoglobin of 12, MCV 80, WBC 7.8, platelets 290. Hemoglobin not substantially different from June 2022 it was 12.1, MCV at that time was 77.8 so improved now. ESR 38, last time 65 June 2022; CRP 22, last time 22 June 2022. Low vitamin D at 18.8 last time 21.5 - taking the weekly supplement  December 2022: CBC hemoglobin 12.7, MCV 74, white cells 9.7, platelets 324 These are labs from [facility], notes are made that iron studies were added, I do not have those at this time.  year round allergies now phlegm on vocal cords

## 2024-09-15 NOTE — ASSESSMENT
[FreeTextEntry1] : 44 F CD on Stelara q7 here in f/u. Doing well CD-wise, though skin issues all summer - now most recently hives, but prior small bumps - itchy, migratory, non-sun exposed areas.

## 2024-09-18 ENCOUNTER — TRANSCRIPTION ENCOUNTER (OUTPATIENT)
Age: 44
End: 2024-09-18

## 2024-09-19 ENCOUNTER — TRANSCRIPTION ENCOUNTER (OUTPATIENT)
Age: 44
End: 2024-09-19

## 2024-11-15 ENCOUNTER — TRANSCRIPTION ENCOUNTER (OUTPATIENT)
Age: 44
End: 2024-11-15

## 2024-11-21 ENCOUNTER — RESULT REVIEW (OUTPATIENT)
Age: 44
End: 2024-11-21

## 2024-11-21 ENCOUNTER — APPOINTMENT (OUTPATIENT)
Dept: GASTROENTEROLOGY | Facility: GI CENTER | Age: 44
End: 2024-11-21

## 2024-11-21 PROCEDURE — 45380 COLONOSCOPY AND BIOPSY: CPT

## 2024-11-27 ENCOUNTER — TRANSCRIPTION ENCOUNTER (OUTPATIENT)
Age: 44
End: 2024-11-27

## 2024-12-02 ENCOUNTER — TRANSCRIPTION ENCOUNTER (OUTPATIENT)
Age: 44
End: 2024-12-02

## 2024-12-05 ENCOUNTER — APPOINTMENT (OUTPATIENT)
Dept: GASTROENTEROLOGY | Facility: CLINIC | Age: 44
End: 2024-12-05
Payer: COMMERCIAL

## 2024-12-05 VITALS
HEART RATE: 88 BPM | OXYGEN SATURATION: 98 % | HEIGHT: 55 IN | SYSTOLIC BLOOD PRESSURE: 118 MMHG | DIASTOLIC BLOOD PRESSURE: 78 MMHG | WEIGHT: 254 LBS | BODY MASS INDEX: 58.78 KG/M2

## 2024-12-05 DIAGNOSIS — K50.10 CROHN'S DISEASE OF LARGE INTESTINE W/OUT COMPLICATIONS: ICD-10-CM

## 2024-12-05 PROCEDURE — 99215 OFFICE O/P EST HI 40 MIN: CPT

## 2024-12-05 RX ORDER — BUDESONIDE 28 MG/1
2 AEROSOL, FOAM RECTAL
Qty: 1 | Refills: 3 | Status: ACTIVE | COMMUNITY
Start: 2024-12-05 | End: 1900-01-01

## 2024-12-05 RX ORDER — MESALAMINE 1000 MG/1
1000 SUPPOSITORY RECTAL
Qty: 90 | Refills: 1 | Status: ACTIVE | COMMUNITY
Start: 2024-12-05 | End: 1900-01-01

## 2024-12-05 RX ORDER — RISANKIZUMAB-RZAA 60 MG/ML
600 INJECTION INTRAVENOUS
Qty: 1 | Refills: 3 | Status: ACTIVE | COMMUNITY
Start: 2024-12-05 | End: 1900-01-01

## 2024-12-05 RX ORDER — RISANKIZUMAB-RZAA 360 MG/2.4
360 KIT SUBCUTANEOUS
Qty: 2.4 | Refills: 12 | Status: ACTIVE | COMMUNITY
Start: 2024-12-05 | End: 1900-01-01

## 2024-12-05 RX ORDER — BUDESONIDE 28 MG/1
2 AEROSOL, FOAM RECTAL
Qty: 1 | Refills: 1 | Status: ACTIVE | COMMUNITY
Start: 2024-12-05 | End: 1900-01-01

## 2024-12-05 RX ORDER — FEXOFENADINE HCL 60 MG
CAPSULE ORAL
Refills: 0 | Status: ACTIVE | COMMUNITY

## 2024-12-27 ENCOUNTER — TRANSCRIPTION ENCOUNTER (OUTPATIENT)
Age: 44
End: 2024-12-27

## 2025-01-01 ENCOUNTER — TRANSCRIPTION ENCOUNTER (OUTPATIENT)
Age: 45
End: 2025-01-01

## 2025-01-23 ENCOUNTER — TRANSCRIPTION ENCOUNTER (OUTPATIENT)
Age: 45
End: 2025-01-23

## 2025-02-03 ENCOUNTER — TRANSCRIPTION ENCOUNTER (OUTPATIENT)
Age: 45
End: 2025-02-03

## 2025-02-06 ENCOUNTER — APPOINTMENT (OUTPATIENT)
Dept: GASTROENTEROLOGY | Facility: CLINIC | Age: 45
End: 2025-02-06
Payer: COMMERCIAL

## 2025-02-06 VITALS
DIASTOLIC BLOOD PRESSURE: 70 MMHG | OXYGEN SATURATION: 98 % | WEIGHT: 254 LBS | BODY MASS INDEX: 45 KG/M2 | RESPIRATION RATE: 16 BRPM | HEIGHT: 63 IN | SYSTOLIC BLOOD PRESSURE: 120 MMHG | HEART RATE: 70 BPM

## 2025-02-06 DIAGNOSIS — K50.10 CROHN'S DISEASE OF LARGE INTESTINE W/OUT COMPLICATIONS: ICD-10-CM

## 2025-02-06 PROCEDURE — 99214 OFFICE O/P EST MOD 30 MIN: CPT

## 2025-02-06 PROCEDURE — G2211 COMPLEX E/M VISIT ADD ON: CPT | Mod: NC

## 2025-03-01 ENCOUNTER — TRANSCRIPTION ENCOUNTER (OUTPATIENT)
Age: 45
End: 2025-03-01

## 2025-03-03 ENCOUNTER — TRANSCRIPTION ENCOUNTER (OUTPATIENT)
Age: 45
End: 2025-03-03

## 2025-03-04 ENCOUNTER — APPOINTMENT (OUTPATIENT)
Dept: GASTROENTEROLOGY | Facility: CLINIC | Age: 45
End: 2025-03-04
Payer: COMMERCIAL

## 2025-03-04 ENCOUNTER — NON-APPOINTMENT (OUTPATIENT)
Age: 45
End: 2025-03-04

## 2025-03-04 VITALS
RESPIRATION RATE: 16 BRPM | SYSTOLIC BLOOD PRESSURE: 130 MMHG | OXYGEN SATURATION: 98 % | DIASTOLIC BLOOD PRESSURE: 82 MMHG | HEART RATE: 82 BPM

## 2025-03-04 VITALS — SYSTOLIC BLOOD PRESSURE: 112 MMHG | RESPIRATION RATE: 16 BRPM | HEART RATE: 83 BPM | DIASTOLIC BLOOD PRESSURE: 72 MMHG

## 2025-03-04 DIAGNOSIS — K50.10 CROHN'S DISEASE OF LARGE INTESTINE W/OUT COMPLICATIONS: ICD-10-CM

## 2025-03-04 DIAGNOSIS — Z09 ENCOUNTER FOR FOLLOW-UP EXAMINATION AFTER COMPLETED TREATMENT FOR CONDITIONS OTHER THAN MALIGNANT NEOPLASM: ICD-10-CM

## 2025-03-04 PROCEDURE — 99213 OFFICE O/P EST LOW 20 MIN: CPT | Mod: 25

## 2025-03-04 PROCEDURE — 96413 CHEMO IV INFUSION 1 HR: CPT

## 2025-03-05 ENCOUNTER — TRANSCRIPTION ENCOUNTER (OUTPATIENT)
Age: 45
End: 2025-03-05

## 2025-03-08 ENCOUNTER — TRANSCRIPTION ENCOUNTER (OUTPATIENT)
Age: 45
End: 2025-03-08

## 2025-03-14 ENCOUNTER — TRANSCRIPTION ENCOUNTER (OUTPATIENT)
Age: 45
End: 2025-03-14

## 2025-03-24 ENCOUNTER — TRANSCRIPTION ENCOUNTER (OUTPATIENT)
Age: 45
End: 2025-03-24

## 2025-03-26 ENCOUNTER — TRANSCRIPTION ENCOUNTER (OUTPATIENT)
Age: 45
End: 2025-03-26

## 2025-04-03 ENCOUNTER — APPOINTMENT (OUTPATIENT)
Dept: GASTROENTEROLOGY | Facility: CLINIC | Age: 45
End: 2025-04-03
Payer: COMMERCIAL

## 2025-04-03 VITALS
OXYGEN SATURATION: 97 % | HEART RATE: 102 BPM | RESPIRATION RATE: 16 BRPM | DIASTOLIC BLOOD PRESSURE: 86 MMHG | SYSTOLIC BLOOD PRESSURE: 132 MMHG

## 2025-04-03 VITALS
DIASTOLIC BLOOD PRESSURE: 84 MMHG | RESPIRATION RATE: 16 BRPM | SYSTOLIC BLOOD PRESSURE: 136 MMHG | OXYGEN SATURATION: 96 % | HEART RATE: 82 BPM

## 2025-04-03 DIAGNOSIS — K50.10 CROHN'S DISEASE OF LARGE INTESTINE W/OUT COMPLICATIONS: ICD-10-CM

## 2025-04-03 PROCEDURE — 96413 CHEMO IV INFUSION 1 HR: CPT

## 2025-04-03 PROCEDURE — 99213 OFFICE O/P EST LOW 20 MIN: CPT | Mod: 25

## 2025-04-03 RX ADMIN — RISANKIZUMAB-RZAA 6 MG/10ML: 60 INJECTION INTRAVENOUS at 00:00

## 2025-04-30 ENCOUNTER — TRANSCRIPTION ENCOUNTER (OUTPATIENT)
Age: 45
End: 2025-04-30

## 2025-05-05 ENCOUNTER — TRANSCRIPTION ENCOUNTER (OUTPATIENT)
Age: 45
End: 2025-05-05

## 2025-05-07 ENCOUNTER — APPOINTMENT (OUTPATIENT)
Dept: GASTROENTEROLOGY | Facility: CLINIC | Age: 45
End: 2025-05-07

## 2025-05-07 ENCOUNTER — NON-APPOINTMENT (OUTPATIENT)
Age: 45
End: 2025-05-07

## 2025-05-07 VITALS — HEART RATE: 84 BPM | SYSTOLIC BLOOD PRESSURE: 118 MMHG | DIASTOLIC BLOOD PRESSURE: 74 MMHG | RESPIRATION RATE: 16 BRPM

## 2025-05-07 VITALS — HEART RATE: 77 BPM | DIASTOLIC BLOOD PRESSURE: 72 MMHG | RESPIRATION RATE: 16 BRPM | SYSTOLIC BLOOD PRESSURE: 118 MMHG

## 2025-05-07 DIAGNOSIS — K50.10 CROHN'S DISEASE OF LARGE INTESTINE W/OUT COMPLICATIONS: ICD-10-CM

## 2025-05-07 PROCEDURE — 96413 CHEMO IV INFUSION 1 HR: CPT

## 2025-05-07 PROCEDURE — 99213 OFFICE O/P EST LOW 20 MIN: CPT | Mod: 25

## 2025-05-07 RX ADMIN — RISANKIZUMAB-RZAA 600 MG/10ML: 60 INJECTION INTRAVENOUS at 00:00

## 2025-05-17 ENCOUNTER — TRANSCRIPTION ENCOUNTER (OUTPATIENT)
Age: 45
End: 2025-05-17

## 2025-07-08 ENCOUNTER — TRANSCRIPTION ENCOUNTER (OUTPATIENT)
Age: 45
End: 2025-07-08

## 2025-07-10 ENCOUNTER — APPOINTMENT (OUTPATIENT)
Dept: GASTROENTEROLOGY | Facility: CLINIC | Age: 45
End: 2025-07-10

## 2025-07-10 ENCOUNTER — RESULT REVIEW (OUTPATIENT)
Age: 45
End: 2025-07-10

## 2025-07-10 PROCEDURE — 45331 SIGMOIDOSCOPY AND BIOPSY: CPT

## 2025-07-12 ENCOUNTER — TRANSCRIPTION ENCOUNTER (OUTPATIENT)
Age: 45
End: 2025-07-12

## 2025-07-15 ENCOUNTER — TRANSCRIPTION ENCOUNTER (OUTPATIENT)
Age: 45
End: 2025-07-15

## (undated) DEVICE — FORCEP ENDOJAW AGTR LC W NDL 2.8MM 230CM DISP

## (undated) DEVICE — STERIS DEFENDO 3-PIECE KIT (AIR/WATER, SUCTION & BIOPSY VALVES)